# Patient Record
Sex: FEMALE | Race: OTHER | HISPANIC OR LATINO
[De-identification: names, ages, dates, MRNs, and addresses within clinical notes are randomized per-mention and may not be internally consistent; named-entity substitution may affect disease eponyms.]

---

## 2018-04-26 ENCOUNTER — APPOINTMENT (OUTPATIENT)
Dept: OBGYN | Facility: CLINIC | Age: 38
End: 2018-04-26

## 2019-03-04 ENCOUNTER — APPOINTMENT (OUTPATIENT)
Dept: INTERNAL MEDICINE | Facility: CLINIC | Age: 39
End: 2019-03-04
Payer: COMMERCIAL

## 2019-03-04 VITALS
DIASTOLIC BLOOD PRESSURE: 70 MMHG | WEIGHT: 203 LBS | BODY MASS INDEX: 37.36 KG/M2 | HEART RATE: 77 BPM | TEMPERATURE: 98.3 F | HEIGHT: 62 IN | OXYGEN SATURATION: 98 % | SYSTOLIC BLOOD PRESSURE: 121 MMHG

## 2019-03-04 DIAGNOSIS — Z00.00 ENCOUNTER FOR GENERAL ADULT MEDICAL EXAMINATION W/OUT ABNORMAL FINDINGS: ICD-10-CM

## 2019-03-04 PROCEDURE — 99396 PREV VISIT EST AGE 40-64: CPT

## 2019-03-05 LAB
25(OH)D3 SERPL-MCNC: 20.8 NG/ML
CHOLEST SERPL-MCNC: 171 MG/DL
CHOLEST/HDLC SERPL: 3.2 RATIO
HBA1C MFR BLD HPLC: 5.5 %
HDLC SERPL-MCNC: 53 MG/DL
LDLC SERPL CALC-MCNC: 99 MG/DL
TRIGL SERPL-MCNC: 97 MG/DL

## 2019-03-05 NOTE — ASSESSMENT
[FreeTextEntry1] : 38 F w/ obesity, pre-DM, vitamin D def, rt ovarian cysts here for CPE c/o varicose vein. \par \par - lipid profile, hga1c \par - vascular surg referral for varicose veins \par \par HCM \par Vaccines - tdap (last pregnancy 2013), influenza (doesn’t take) \par Cancer Screening - pap (3/2016 - nml, HPV neg). No FHx colon or breast cancer -> appt. March 14th \par ETOH / Smoking - Social / Never \par Depression Screen - Negative

## 2019-03-05 NOTE — HISTORY OF PRESENT ILLNESS
[de-identified] : 38 F w/ obesity, pre-DM, vitamin D def, rt ovarian cysts here for CPE c/o varicose vein. \par \par Patient has continued to exercise and eat well, has lost a few pounds. \par \par HCM \par Vaccines - tdap (last pregnancy 2013), influenza (doesn’t take) \par Cancer Screening - pap (3/2016 - nml, HPV neg). No FHx colon or breast cancer -> appt. March 14th \par ETOH / Smoking - Social / Never \par Depression Screen - Negative

## 2019-03-05 NOTE — PHYSICAL EXAM
[No Acute Distress] : no acute distress [Well Nourished] : well nourished [Well Developed] : well developed [Well-Appearing] : well-appearing [Normal Sclera/Conjunctiva] : normal sclera/conjunctiva [PERRL] : pupils equal round and reactive to light [Normal Outer Ear/Nose] : the outer ears and nose were normal in appearance [Normal Oropharynx] : the oropharynx was normal [Normal TMs] : both tympanic membranes were normal [No JVD] : no jugular venous distention [No Respiratory Distress] : no respiratory distress  [Clear to Auscultation] : lungs were clear to auscultation bilaterally [No Accessory Muscle Use] : no accessory muscle use [Normal Rate] : normal rate  [Regular Rhythm] : with a regular rhythm [Normal S1, S2] : normal S1 and S2 [No Murmur] : no murmur heard [No Edema] : there was no peripheral edema [Soft] : abdomen soft [Non Tender] : non-tender [Non-distended] : non-distended [No Masses] : no abdominal mass palpated [No HSM] : no HSM [Normal Bowel Sounds] : normal bowel sounds [No Rash] : no rash [Normal Insight/Judgement] : insight and judgment were intact

## 2019-03-14 ENCOUNTER — APPOINTMENT (OUTPATIENT)
Dept: OBGYN | Facility: CLINIC | Age: 39
End: 2019-03-14

## 2019-04-22 ENCOUNTER — APPOINTMENT (OUTPATIENT)
Dept: INTERNAL MEDICINE | Facility: CLINIC | Age: 39
End: 2019-04-22

## 2023-04-18 ENCOUNTER — APPOINTMENT (OUTPATIENT)
Dept: INTERNAL MEDICINE | Facility: CLINIC | Age: 43
End: 2023-04-18

## 2023-04-27 ENCOUNTER — APPOINTMENT (OUTPATIENT)
Dept: OBGYN | Facility: CLINIC | Age: 43
End: 2023-04-27
Payer: COMMERCIAL

## 2023-04-27 ENCOUNTER — LABORATORY RESULT (OUTPATIENT)
Age: 43
End: 2023-04-27

## 2023-04-27 VITALS
WEIGHT: 190 LBS | DIASTOLIC BLOOD PRESSURE: 70 MMHG | BODY MASS INDEX: 34.96 KG/M2 | SYSTOLIC BLOOD PRESSURE: 105 MMHG | HEIGHT: 62 IN

## 2023-04-27 DIAGNOSIS — Z01.419 ENCOUNTER FOR GYNECOLOGICAL EXAMINATION (GENERAL) (ROUTINE) W/OUT ABNORMAL FINDINGS: ICD-10-CM

## 2023-04-27 DIAGNOSIS — R92.2 INCONCLUSIVE MAMMOGRAM: ICD-10-CM

## 2023-04-27 PROCEDURE — 99386 PREV VISIT NEW AGE 40-64: CPT

## 2023-04-27 NOTE — HISTORY OF PRESENT ILLNESS
[FreeTextEntry1] : 35-year-old P2 LMP, regular menses\par Patient has not had a mammogram\par Last Pap 2016 was negative HPV negative\par Patient will be 43 years old in June\par Condoms for contraception\par Family history negative for cancer\par Patient has had 2 COVID vaccines

## 2023-04-27 NOTE — DISCUSSION/SUMMARY
[FreeTextEntry1] : 35-year-old P2 LMP, regular menses\par Pap with cotesting\par Screening mammogram and bilateral breast ultrasound for dense breast\par Screening colonoscopy referral\par Condoms for contraception\par Patient has had 2 COVID vaccines, by Valent COVID-vaccine recommend\par Follow-up 1 year

## 2023-05-10 ENCOUNTER — NON-APPOINTMENT (OUTPATIENT)
Age: 43
End: 2023-05-10

## 2023-05-10 LAB
CYTOLOGY CVX/VAG DOC THIN PREP: ABNORMAL
HPV HIGH+LOW RISK DNA PNL CVX: DETECTED

## 2023-05-23 ENCOUNTER — APPOINTMENT (OUTPATIENT)
Dept: OBGYN | Facility: CLINIC | Age: 43
End: 2023-05-23
Payer: COMMERCIAL

## 2023-05-23 VITALS
SYSTOLIC BLOOD PRESSURE: 115 MMHG | HEIGHT: 62 IN | WEIGHT: 197 LBS | BODY MASS INDEX: 36.25 KG/M2 | DIASTOLIC BLOOD PRESSURE: 80 MMHG

## 2023-05-23 DIAGNOSIS — R87.610 ATYPICAL SQUAMOUS CELLS OF UNDETERMINED SIGNIFICANCE ON CYTOLOGIC SMEAR OF CERVIX (ASC-US): ICD-10-CM

## 2023-05-23 DIAGNOSIS — R87.810 ATYPICAL SQUAMOUS CELLS OF UNDETERMINED SIGNIFICANCE ON CYTOLOGIC SMEAR OF CERVIX (ASC-US): ICD-10-CM

## 2023-05-23 LAB
HCG UR QL: NEGATIVE
QUALITY CONTROL: YES

## 2023-05-23 PROCEDURE — 81025 URINE PREGNANCY TEST: CPT

## 2023-05-23 PROCEDURE — 57454 BX/CURETT OF CERVIX W/SCOPE: CPT

## 2023-05-23 NOTE — PROCEDURE
[Colposcopy] : Colposcopy  [Risks] : risks [Benefits] : benefits [Alternatives] : alternatives [Patient] : patient [Infection] : infection [Bleeding] : bleeding [Allergic Reaction] : allergic reaction [ASCUS] : ASCUS [HPV High Risk] : HPV high risk [No Premedication] : no premedication [Colposcopy Adequate] : colposcopy adequate [SCI Fully Visualized] : SCI fully visualized [ECC Performed] : ECC performed [No Abnormalities] : no abnormalities [Lesion] : lesion seen [Biopsy] : biopsy taken [Hemostasis Obtained] : Hemostasis obtained [Tolerated Well] : the patient tolerated the procedure well [Pap Performed] : pap not performed [de-identified] : +HPV, negative HPV genotype [de-identified] : 3 [de-identified] : ACW lesion at 6 o'clock and 10 o'clock  [de-identified] : ACW lesion at 6 o'clock and 10 o'clock and ECC  [de-identified] : Cervical biopsy and ECC (endocervical canal narrow) [de-identified] : with  pressure and Astrin  [de-identified] : Colpo impression, Squamous metaplasia\par

## 2023-06-30 ENCOUNTER — NON-APPOINTMENT (OUTPATIENT)
Age: 43
End: 2023-06-30

## 2023-08-23 ENCOUNTER — NON-APPOINTMENT (OUTPATIENT)
Age: 43
End: 2023-08-23

## 2023-09-13 ENCOUNTER — NON-APPOINTMENT (OUTPATIENT)
Age: 43
End: 2023-09-13

## 2023-09-13 LAB — CORE LAB BIOPSY: NORMAL

## 2024-02-08 ENCOUNTER — APPOINTMENT (OUTPATIENT)
Dept: OBGYN | Facility: CLINIC | Age: 44
End: 2024-02-08
Payer: COMMERCIAL

## 2024-02-08 VITALS — DIASTOLIC BLOOD PRESSURE: 80 MMHG | SYSTOLIC BLOOD PRESSURE: 130 MMHG

## 2024-02-08 PROCEDURE — 99214 OFFICE O/P EST MOD 30 MIN: CPT

## 2024-02-08 NOTE — HISTORY OF PRESENT ILLNESS
[FreeTextEntry1] : 43-year-old LMP 1/5/2024 and bleeding since spotting 4 months patient feels like her.  Is starting to come today bleeding is getting heavier Patient is not taking hormone therapy, no history of thyroid issues No pain mild cramping Irregular cycles for the past 3 months in December menses delayed 10 days

## 2024-02-08 NOTE — PHYSICAL EXAM
[Labia Majora] : normal [Labia Minora] : normal [Moderate] : There was moderate vaginal bleeding [Normal] : normal [Retroversion] : retroverted [Uterine Adnexae] : normal [FreeTextEntry5] : cervix closed, globular posteriorly  [FreeTextEntry6] : [facility]

## 2024-02-08 NOTE — DISCUSSION/SUMMARY
[FreeTextEntry1] : 43-year-old bleeding for 1 month UCG negative today Labs Pelvic ultrasound 1 week  EMB and Endo see 2-3 weeks

## 2024-02-12 ENCOUNTER — NON-APPOINTMENT (OUTPATIENT)
Age: 44
End: 2024-02-12

## 2024-02-12 DIAGNOSIS — R79.89 OTHER SPECIFIED ABNORMAL FINDINGS OF BLOOD CHEMISTRY: ICD-10-CM

## 2024-02-12 LAB
BASOPHILS # BLD AUTO: 0.02 K/UL
BASOPHILS NFR BLD AUTO: 0.3 %
EOSINOPHIL # BLD AUTO: 0.27 K/UL
EOSINOPHIL NFR BLD AUTO: 3.6 %
FSH SERPL-MCNC: 8.2 IU/L
HCT VFR BLD CALC: 37.9 %
HGB BLD-MCNC: 12.2 G/DL
IMM GRANULOCYTES NFR BLD AUTO: 0.3 %
LYMPHOCYTES # BLD AUTO: 2.16 K/UL
LYMPHOCYTES NFR BLD AUTO: 28.9 %
MAN DIFF?: NORMAL
MCHC RBC-ENTMCNC: 28.5 PG
MCHC RBC-ENTMCNC: 32.2 GM/DL
MCV RBC AUTO: 88.6 FL
MONOCYTES # BLD AUTO: 0.51 K/UL
MONOCYTES NFR BLD AUTO: 6.8 %
NEUTROPHILS # BLD AUTO: 4.5 K/UL
NEUTROPHILS NFR BLD AUTO: 60.1 %
PLATELET # BLD AUTO: 357 K/UL
PROLACTIN SERPL-MCNC: 27.2 NG/ML
RBC # BLD: 4.28 M/UL
RBC # FLD: 13.5 %
TSH SERPL-ACNC: 0.76 UIU/ML
WBC # FLD AUTO: 7.48 K/UL

## 2024-02-15 ENCOUNTER — APPOINTMENT (OUTPATIENT)
Dept: OBGYN | Facility: CLINIC | Age: 44
End: 2024-02-15
Payer: COMMERCIAL

## 2024-02-15 ENCOUNTER — ASOB RESULT (OUTPATIENT)
Age: 44
End: 2024-02-15

## 2024-02-15 VITALS
HEIGHT: 62 IN | WEIGHT: 209 LBS | BODY MASS INDEX: 38.46 KG/M2 | SYSTOLIC BLOOD PRESSURE: 112 MMHG | DIASTOLIC BLOOD PRESSURE: 77 MMHG

## 2024-02-15 PROCEDURE — 76830 TRANSVAGINAL US NON-OB: CPT

## 2024-02-15 PROCEDURE — 76856 US EXAM PELVIC COMPLETE: CPT | Mod: 59

## 2024-03-04 ENCOUNTER — APPOINTMENT (OUTPATIENT)
Dept: OBGYN | Facility: CLINIC | Age: 44
End: 2024-03-04
Payer: COMMERCIAL

## 2024-03-04 VITALS — DIASTOLIC BLOOD PRESSURE: 81 MMHG | SYSTOLIC BLOOD PRESSURE: 119 MMHG

## 2024-03-04 LAB — PROLACTIN SERPL-MCNC: 16.8 NG/ML

## 2024-03-04 PROCEDURE — 58558Z: CUSTOM

## 2024-03-04 NOTE — PROCEDURE
[Endometrial Biopsy] : Endometrial biopsy [Irregular Bleeding] : irregular uterine bleeding [Uterine Perforation] : uterine perforation [Pain] : pain [Negative] : negative pregnancy test [No Premedication] : No premedication [Betadine] : Betadine [Tenaculum] : Tenaculum [___ mL Injected] : [unfilled] ~UmL of lidocaine [Required Dilation] : required dilation [Sounded to ___ cm] : sounded to [unfilled] ~Ucm [Anteverted] : anteverted [Moderate] : moderate [Specimen Collected] : collected [Hysteroscopy] : Hysteroscopy [No Complications] : No complications [Time out performed] : Pre-procedure time out performed.  Patient's name, date of birth and procedure confirmed. [Consent Obtained] : Consent obtained [Abnormal uterine bleeding] : abnormal uterine bleeding [Benefits] : benefits [Risks] : risks [Alternatives] : alternatives [Patient] : patient [Bleeding] : bleeding [Infection] : infection [Allergic Reaction] : allergic reaction [Lidocaine___ mL] : [unfilled] ~UmL of lidocaine [Sent to Pathology] : specimen was placed in buffered formalin and sent for pathology [Hemostasis obtained] : hemostasis obtained [Tolerated Well] : Patient tolerated the procedure well [Antibiotics given] : antibiotics not given [LMPDate] : 1/4/24 [de-identified] : 10cc lidocaine administered locally at 12 o'clock of the cervix  [de-identified] : stenotic os dilated with os finder, + blood flow of old blood with clots noted once cervix dilated  [de-identified] : Endosee office hysteroscopy (see separate note)  [de-identified] : bleeding for over 1 month  [de-identified] : Endosee office hysteroscopy: Vagina was prepped with Betadine 10 cc of local lidocaine administered at 12:00. anterior lip of cervix grasped with a tenaculum.  The cervix cervical os stenotic and dilated with an os finder EMB was performed sounded to 10 Endo see no cervical canal polyps a lower uterine segment 1 cm noted string not visualized bilaterally Limited study with polyp visualized.  100cc of saline used. Patient tolerated procedure well

## 2024-03-09 ENCOUNTER — NON-APPOINTMENT (OUTPATIENT)
Age: 44
End: 2024-03-09

## 2024-03-09 LAB — CORE LAB BIOPSY: NORMAL

## 2024-03-19 ENCOUNTER — APPOINTMENT (OUTPATIENT)
Dept: OBGYN | Facility: CLINIC | Age: 44
End: 2024-03-19

## 2024-03-21 ENCOUNTER — APPOINTMENT (OUTPATIENT)
Dept: OBGYN | Facility: CLINIC | Age: 44
End: 2024-03-21
Payer: COMMERCIAL

## 2024-03-21 VITALS — DIASTOLIC BLOOD PRESSURE: 74 MMHG | SYSTOLIC BLOOD PRESSURE: 123 MMHG

## 2024-03-21 PROCEDURE — 99202 OFFICE O/P NEW SF 15 MIN: CPT

## 2024-03-21 PROCEDURE — 99212 OFFICE O/P EST SF 10 MIN: CPT

## 2024-03-21 NOTE — HISTORY OF PRESENT ILLNESS
[FreeTextEntry1] : 42 y/o P2 LMP 3/18/24 Reviewed EMB results, + polyp, endosee + polyp   Bleeding improved with the provera.stopped bleeding on 3/6/24 after starting provera on 3/1/24  started bleeding 4 days ago and stopped the pills at that time, currently bleeding but its normal  flow not excessive.  Jeff has appointment with Dr. Reyes to plan D/C operative polyp resection Considering Mirena IUD insertion

## 2024-03-21 NOTE — DISCUSSION/SUMMARY
[FreeTextEntry1] : 44 y/o P2 LMP 3/18/24 Reviewed EMB results, + polyp, endosee + polyp  Andertaileen has appointment with Dr. Reyes to plan D/C operative polyp resection Considering Mirena IUD insertion, discussed R/B of Mirena IUD ectopic pregnancy, PID, benefits of contraception and manage the bleeding  no exam today, discussion only  f/u in 1-2 weeks to f/u ovarian cyst

## 2024-04-11 ENCOUNTER — APPOINTMENT (OUTPATIENT)
Dept: OBGYN | Facility: CLINIC | Age: 44
End: 2024-04-11

## 2024-04-16 ENCOUNTER — APPOINTMENT (OUTPATIENT)
Dept: OBGYN | Facility: CLINIC | Age: 44
End: 2024-04-16
Payer: COMMERCIAL

## 2024-04-16 VITALS
DIASTOLIC BLOOD PRESSURE: 86 MMHG | HEIGHT: 62 IN | WEIGHT: 209 LBS | SYSTOLIC BLOOD PRESSURE: 120 MMHG | BODY MASS INDEX: 38.46 KG/M2

## 2024-04-16 DIAGNOSIS — N84.0 POLYP OF CORPUS UTERI: ICD-10-CM

## 2024-04-16 PROCEDURE — 99213 OFFICE O/P EST LOW 20 MIN: CPT

## 2024-04-16 NOTE — REASON FOR VISIT
[TextEntry] : PT PRESENTS TO DISCUSS MGT OF ENDOMETRIAL POLYP.  HAS BEEN HAVING IRREG BLEEDING SINCE JAN.

## 2024-04-16 NOTE — PLAN
[FreeTextEntry1] : RECOMMEND D&C, OP HYST, POLYPECTOMY.  DISCUSSED RISKS OF PROCEDURE INCLUDING BUT NOT LIMITED TO RISKS OF GENERAL ANESTHESIA, BLEEDING, INFECTION AND UTERINE PERFORATION,  ALL QUESTIONS ANSWERED.  DISCUSSED OPTION OF INSERTING MIRENA AT TIME OF PROCEDURE.  PT DESIRES MIRENA.  WILL SCHEDULE

## 2024-05-07 ENCOUNTER — OUTPATIENT (OUTPATIENT)
Dept: OUTPATIENT SERVICES | Facility: HOSPITAL | Age: 44
LOS: 1 days | End: 2024-05-07
Payer: COMMERCIAL

## 2024-05-07 VITALS
HEART RATE: 87 BPM | DIASTOLIC BLOOD PRESSURE: 84 MMHG | HEIGHT: 61 IN | SYSTOLIC BLOOD PRESSURE: 126 MMHG | RESPIRATION RATE: 18 BRPM | OXYGEN SATURATION: 100 % | WEIGHT: 214.07 LBS | TEMPERATURE: 99 F

## 2024-05-07 DIAGNOSIS — N92.1 EXCESSIVE AND FREQUENT MENSTRUATION WITH IRREGULAR CYCLE: ICD-10-CM

## 2024-05-07 DIAGNOSIS — Z01.818 ENCOUNTER FOR OTHER PREPROCEDURAL EXAMINATION: ICD-10-CM

## 2024-05-07 DIAGNOSIS — N84.0 POLYP OF CORPUS UTERI: ICD-10-CM

## 2024-05-07 DIAGNOSIS — Z30.430 ENCOUNTER FOR INSERTION OF INTRAUTERINE CONTRACEPTIVE DEVICE: ICD-10-CM

## 2024-05-07 LAB
HCT VFR BLD CALC: 37.7 % — SIGNIFICANT CHANGE UP (ref 34.5–45)
HGB BLD-MCNC: 11.9 G/DL — SIGNIFICANT CHANGE UP (ref 11.5–15.5)
MCHC RBC-ENTMCNC: 27.5 PG — SIGNIFICANT CHANGE UP (ref 27–34)
MCHC RBC-ENTMCNC: 31.6 GM/DL — LOW (ref 32–36)
MCV RBC AUTO: 87.3 FL — SIGNIFICANT CHANGE UP (ref 80–100)
NRBC # BLD: 0 /100 WBCS — SIGNIFICANT CHANGE UP (ref 0–0)
PLATELET # BLD AUTO: 371 K/UL — SIGNIFICANT CHANGE UP (ref 150–400)
RBC # BLD: 4.32 M/UL — SIGNIFICANT CHANGE UP (ref 3.8–5.2)
RBC # FLD: 13.1 % — SIGNIFICANT CHANGE UP (ref 10.3–14.5)
WBC # BLD: 8.64 K/UL — SIGNIFICANT CHANGE UP (ref 3.8–10.5)
WBC # FLD AUTO: 8.64 K/UL — SIGNIFICANT CHANGE UP (ref 3.8–10.5)

## 2024-05-07 PROCEDURE — G0463: CPT

## 2024-05-07 PROCEDURE — 85027 COMPLETE CBC AUTOMATED: CPT

## 2024-05-07 PROCEDURE — 86803 HEPATITIS C AB TEST: CPT

## 2024-05-07 RX ORDER — SODIUM CHLORIDE 9 MG/ML
1000 INJECTION, SOLUTION INTRAVENOUS
Refills: 0 | Status: DISCONTINUED | OUTPATIENT
Start: 2024-05-28 | End: 2024-06-12

## 2024-05-07 RX ORDER — MEDROXYPROGESTERONE ACETATE 10 MG/1
10 TABLET ORAL
Qty: 30 | Refills: 0 | Status: ACTIVE | COMMUNITY
Start: 2024-03-04 | End: 1900-01-01

## 2024-05-07 NOTE — H&P PST ADULT - PROBLEM SELECTOR PLAN 1
Pt. scheduled for D&C , Operative Hysteroscopy, Polypectomy with Aveta, IUD Insertion (Mirena) with Dr. Reyes on 5/28/24.  Pre-op instructions given, all questions answered.  Specimen Cup given.  Labs: CBC, Hep C

## 2024-05-07 NOTE — H&P PST ADULT - HISTORY OF PRESENT ILLNESS
44 y/o  (LMP 2024) with PMH uterine polyp, PShx  x2. C/o abnormal uterine bleeding. Pt states in January her menstrual started but never stopped. Pt was seen by GYN and started on Provera 3/1/24. Reports she stopped bleeding on 3/6/24 after starting provera on 3/1/24. Endorse she had a short cycle in April and stopped after a few days. EMB results, reveal + polyp. Denies any Hematuria, dysuria, chest pain, palpitations, SOB, N/V, fever or chills. She now presents to PST prior to D&C , Operative Hysteroscopy, Polypectomy with Aveta, IUD Insertion (Mirena) with Dr. Reyes on 24.  ?

## 2024-05-07 NOTE — H&P PST ADULT - NSANTHTOTALSCORECAL_ENT_A_CORE
Referral Reason:sob



MEASUREMENTS

--------

HEIGHT: 170.2 cm

WEIGHT: 65.8 kg

BP: 177/101

RVIDd:   2.8 cm     (< 3.3)

IVSd:   1.0 cm     (0.6 - 1.1)

LVIDd:   4.4 cm     (3.9 - 5.3)

LVPWd:   1.0 cm     (0.6 - 1.1)

IVSs:   1.5 cm

LVIDs:   2.9 cm

LVPWs:   1.4 cm

LAESV Index (A-L):   22.44 ml/m

Ao Diam:   3.8 cm     (2.0 - 3.7)

AV Cusp:   2.3 cm     (1.5 - 2.6)

LA Diam:   3.2 cm     (2.7 - 3.8)

EPSS:   1.0 cm

MV E Danie:   0.96 m/s

MV DecT:   210 ms

MV A Danie:   0.85 m/s

MV E/A Ratio:   1.13 

RAP:   5.00 mmHg

RVSP:   22.02 mmHg

MV EF SLOPE:   179.51 mm/s     (70 - 150)

MV EXCURSION:   2.51 cm     (> 18.000)







FINDINGS

--------

Sinus rhythm.

This was a technically adequate study.

The left ventricular size is normal.   Left ventricular wall thickness is normal.   Overall left vent
ricular systolic function is normal with, an EF between 55 - 60 %.

The right ventricle is normal in size and function.

Normal LA  size by volume 22+/-6 ml/m2.

The right atrium is normal in size.

Aortic valve is trileaflet and is mildly thickened.   There is no evidence of aortic regurgitation.  
 There is no evidence of aortic stenosis.

Mild mitral annular calcification present.   There is trace to mild mitral regurgitation.

Trace tricuspid regurgitation present.   Right ventricular systolic pressure is normal at < 35 mmHg. 
  There is no evidence of pulmonary hypertension.

Trace/mild (physiologic)  pulmonic regurgitation.

The aortic root size is normal.

Normal inferior vena cava with normal inspiratory collapse consistent with estimated right atrial pre
ssure of  5 mmHg.

There is no pericardial effusion.



CONCLUSIONS

--------

1. Sinus rhythm.

2. This was a technically adequate study.

3. The left ventricular size is normal.

4. Left ventricular wall thickness is normal.

5. Overall left ventricular systolic function is normal with, an EF between 55 - 60 %.

6. Normal LA size by volume 22+/-6 ml/m2.

7. Aortic valve is trileaflet and is mildly thickened.

8. Mild mitral annular calcification present.

9. There is trace to mild mitral regurgitation.

10. Trace tricuspid regurgitation present.

11. Right ventricular systolic pressure is normal at < 35 mmHg.

12. There is no evidence of pulmonary hypertension.

13. Trace/mild (physiologic)  pulmonic regurgitation.

14. The aortic root size is normal.

15. There is no pericardial effusion.





SONOGRAPHER: Mal Lara RDCS
0

## 2024-05-07 NOTE — H&P PST ADULT - ATTENDING COMMENTS
44yo w irreg bleeding.  Admit for D&C, op hyst, polypectomy and insertion of Mirena IUD.  Discussed risks and benefits of procedure.  All questions answered.  Consent signed.

## 2024-05-07 NOTE — H&P PST ADULT - ASSESSMENT
DASI Score:8.97  DASI Activity: Pt drives, walks 30 minutes day, performs ADL's without assistance able to go up one flight of stairs or walk 1-2 blocks with out difficulty  Loose or removable teeth: denies

## 2024-05-08 LAB
HCV AB S/CO SERPL IA: 0.11 S/CO — SIGNIFICANT CHANGE UP (ref 0–0.99)
HCV AB SERPL-IMP: SIGNIFICANT CHANGE UP

## 2024-05-27 ENCOUNTER — TRANSCRIPTION ENCOUNTER (OUTPATIENT)
Age: 44
End: 2024-05-27

## 2024-05-28 ENCOUNTER — NON-APPOINTMENT (OUTPATIENT)
Age: 44
End: 2024-05-28

## 2024-05-28 ENCOUNTER — TRANSCRIPTION ENCOUNTER (OUTPATIENT)
Age: 44
End: 2024-05-28

## 2024-05-28 ENCOUNTER — APPOINTMENT (OUTPATIENT)
Dept: OBGYN | Facility: CLINIC | Age: 44
End: 2024-05-28

## 2024-05-28 ENCOUNTER — OUTPATIENT (OUTPATIENT)
Dept: OUTPATIENT SERVICES | Facility: HOSPITAL | Age: 44
LOS: 1 days | End: 2024-05-28
Payer: COMMERCIAL

## 2024-05-28 ENCOUNTER — RESULT REVIEW (OUTPATIENT)
Age: 44
End: 2024-05-28

## 2024-05-28 VITALS
OXYGEN SATURATION: 99 % | TEMPERATURE: 98 F | RESPIRATION RATE: 16 BRPM | HEIGHT: 61 IN | HEART RATE: 83 BPM | SYSTOLIC BLOOD PRESSURE: 113 MMHG | DIASTOLIC BLOOD PRESSURE: 74 MMHG | WEIGHT: 214.07 LBS

## 2024-05-28 VITALS
HEART RATE: 85 BPM | RESPIRATION RATE: 18 BRPM | SYSTOLIC BLOOD PRESSURE: 127 MMHG | DIASTOLIC BLOOD PRESSURE: 70 MMHG | OXYGEN SATURATION: 98 %

## 2024-05-28 DIAGNOSIS — N84.0 POLYP OF CORPUS UTERI: ICD-10-CM

## 2024-05-28 DIAGNOSIS — Z30.430 ENCOUNTER FOR INSERTION OF INTRAUTERINE CONTRACEPTIVE DEVICE: ICD-10-CM

## 2024-05-28 DIAGNOSIS — N92.1 EXCESSIVE AND FREQUENT MENSTRUATION WITH IRREGULAR CYCLE: ICD-10-CM

## 2024-05-28 PROCEDURE — 58300 INSERT INTRAUTERINE DEVICE: CPT

## 2024-05-28 PROCEDURE — 88305 TISSUE EXAM BY PATHOLOGIST: CPT

## 2024-05-28 PROCEDURE — 88305 TISSUE EXAM BY PATHOLOGIST: CPT | Mod: 26

## 2024-05-28 PROCEDURE — 58558 HYSTEROSCOPY BIOPSY: CPT

## 2024-05-28 PROCEDURE — C1782: CPT

## 2024-05-28 DEVICE — IUD MIRENA: Type: IMPLANTABLE DEVICE | Status: FUNCTIONAL

## 2024-05-28 DEVICE — AVETA SMOL RESECTING DEVICE 2.9MM: Type: IMPLANTABLE DEVICE | Status: FUNCTIONAL

## 2024-05-28 RX ORDER — OXYCODONE HYDROCHLORIDE 5 MG/1
10 TABLET ORAL ONCE
Refills: 0 | Status: DISCONTINUED | OUTPATIENT
Start: 2024-05-28 | End: 2024-05-28

## 2024-05-28 RX ORDER — LIDOCAINE HCL 20 MG/ML
0.2 VIAL (ML) INJECTION ONCE
Refills: 0 | Status: COMPLETED | OUTPATIENT
Start: 2024-05-28 | End: 2024-05-28

## 2024-05-28 RX ORDER — FENTANYL CITRATE 50 UG/ML
25 INJECTION INTRAVENOUS
Refills: 0 | Status: DISCONTINUED | OUTPATIENT
Start: 2024-05-28 | End: 2024-05-28

## 2024-05-28 RX ORDER — ONDANSETRON 8 MG/1
4 TABLET, FILM COATED ORAL ONCE
Refills: 0 | Status: DISCONTINUED | OUTPATIENT
Start: 2024-05-28 | End: 2024-06-12

## 2024-05-28 RX ORDER — MEDROXYPROGESTERONE ACETATE 150 MG/ML
1 INJECTION, SUSPENSION, EXTENDED RELEASE INTRAMUSCULAR
Refills: 0 | DISCHARGE

## 2024-05-28 RX ADMIN — SODIUM CHLORIDE 100 MILLILITER(S): 9 INJECTION, SOLUTION INTRAVENOUS at 13:43

## 2024-05-28 NOTE — ASU DISCHARGE PLAN (ADULT/PEDIATRIC) - ASU DC SPECIAL INSTRUCTIONSFT
Postoperative Instructions      Pain control     For pain control, take the followin. Motrin 600mg four times a day, take with food.  2. Add Tylenol 975 four times a day, alternated with motrin.    Motrin and Tylenol can be obtained over the counter.    Postoperative restrictions   Do not drive or make important decisions for 24 hours after anesthesia. Nothing in the vagina (tampons, sexual intercourse), no tub baths, pools or hot tubs for 2 weeks (showers are ok!). No lifting anything heavier than 15 lbs, no strenuous exercise for 2 weeks after surgery.        Vaginal bleeding   Spotting and intermittent passage of blood clots per vagina is normal in first few weeks after surgery.  If you are soaking 1 pad per hour, that is NOT normal and you should notify Dr. Reyes's office and seek medical attention right away.      Signs of Infection    Call the office and/or come to the emergency room for any of the following: foul smelling vaginal discharge, fever over 100.4F, abdominal pain or cramping that does not get better with over the counter medications, nausea/vomiting (especially if you become unable to tolerate oral intake), inability to urinate. Any of these could be signs that you may be developing an infection requiring antibiotics.      Follow Up  Follow up with Dr. Reyes's office for postoperative appointment on  at 5pm.

## 2024-05-28 NOTE — BRIEF OPERATIVE NOTE - NSICDXBRIEFPOSTOP_GEN_ALL_CORE_FT
POST-OP DIAGNOSIS:  Polyp of corpus uteri 28-May-2024 15:24:43  Marline Hernandes  Excessive and frequent menstruation 28-May-2024 15:24:53  Marline Hernandes  Encounter for IUD insertion 28-May-2024 15:25:14  Marline Hernandes

## 2024-05-28 NOTE — PRE-ANESTHESIA EVALUATION ADULT - NSANTHADDINFOFT_GEN_ALL_CORE
r/b/a discussed  all questions answered
Detail Level: Simple
Additional Notes: Patient consent was obtained to proceed with the visit and recommended plan of care after discussion of all risks and benefits, including the risks of COVID-19 exposure.

## 2024-05-28 NOTE — ASU DISCHARGE PLAN (ADULT/PEDIATRIC) - NURSING INSTRUCTIONS
Next dose of tylenol at/after_0830pm___. Do not exceed 4000mg in a 24hour  period. Every 6hours as needed.   Next dose of motrin at/after 0830pm

## 2024-05-28 NOTE — BRIEF OPERATIVE NOTE - NSICDXBRIEFPREOP_GEN_ALL_CORE_FT
PRE-OP DIAGNOSIS:  Polyp of corpus uteri 28-May-2024 15:24:16  Marline Hernandes  Excessive and frequent menstruation 28-May-2024 15:24:34  Marline Hernandes  Encounter for IUD insertion 28-May-2024 15:25:22  Marline Hernandes

## 2024-05-28 NOTE — ASU DISCHARGE PLAN (ADULT/PEDIATRIC) - CARE PROVIDER_API CALL
Rossy Reyes  Obstetrics and Gynecology  [address], [facility] [facility] [address]  [place] [address]  Phone: [phone]  Fax: [phone]  Follow Up Time:

## 2024-05-28 NOTE — ASU DISCHARGE PLAN (ADULT/PEDIATRIC) - ACTIVITY LEVEL
Interval History: Feeling better, may dc to SNF, C/W current care    Review of Systems   Constitutional:  Positive for activity change. Negative for appetite change, chills and fever.   HENT:  Negative for sore throat.    Respiratory:  Positive for cough and shortness of breath.    Cardiovascular:  Negative for chest pain, palpitations and leg swelling.   Gastrointestinal:  Negative for abdominal pain and nausea.   Genitourinary:  Negative for dysuria.   Musculoskeletal:  Negative for back pain.   Skin:  Negative for rash.   Neurological:  Positive for weakness.     Objective:     Vital Signs (Most Recent):  Temp: 99 °F (37.2 °C) (08/08/23 0925)  Pulse: 103 (08/08/23 0925)  Resp: 20 (08/08/23 0925)  BP: 128/66 (08/08/23 0925)  SpO2: (!) 91 % (08/08/23 0925) Vital Signs (24h Range):  Temp:  [98.3 °F (36.8 °C)-99 °F (37.2 °C)] 99 °F (37.2 °C)  Pulse:  [] 103  Resp:  [16-24] 20  SpO2:  [88 %-98 %] 91 %  BP: (128-198)/(66-98) 128/66     Weight: 91.5 kg (201 lb 11.5 oz)  Body mass index is 34.63 kg/m².    Intake/Output Summary (Last 24 hours) at 8/8/2023 1351  Last data filed at 8/7/2023 2358  Gross per 24 hour   Intake --   Output 500 ml   Net -500 ml         Physical Exam  Constitutional:       Appearance: She is obese. She is ill-appearing.      Interventions: Nasal cannula in place.   HENT:      Head: Normocephalic and atraumatic.   Eyes:      General: No scleral icterus.     Pupils: Pupils are equal, round, and reactive to light.   Cardiovascular:      Rate and Rhythm: Normal rate and regular rhythm.      Pulses: Normal pulses.      Heart sounds: No murmur heard.  Pulmonary:      Effort: Pulmonary effort is normal. No respiratory distress.      Breath sounds: Rales present. No wheezing.   Abdominal:      General: Bowel sounds are normal. There is no distension.      Palpations: Abdomen is soft.      Tenderness: There is no abdominal tenderness. There is no guarding.   Musculoskeletal:      Cervical back: Neck  No exercise/No heavy lifting/Nothing per vagina/No tub baths/No douching/No tampons/No intercourse supple.      Right lower leg: No edema.      Left lower leg: No edema.   Skin:     General: Skin is warm and dry.   Neurological:      General: No focal deficit present.   Psychiatric:         Mood and Affect: Mood normal.         Behavior: Behavior normal.             Significant Labs: All pertinent labs within the past 24 hours have been reviewed.  CBC:   Recent Labs   Lab 08/07/23  1454 08/08/23  0316   WBC 6.23 3.68*   HGB 8.7* 9.7*   HCT 32.2* 34.6*    255       Significant Imaging: I have reviewed all pertinent imaging results/findings within the past 24 hours.

## 2024-05-28 NOTE — BRIEF OPERATIVE NOTE - OPERATION/FINDINGS
EUA: Normal external female genitalia. Small mobile retroverted and retroflexed uterus, no adnexal masses palpated bilaterally.   Ostia visualized bilaterally. Normal appearing endometrium with scattered areas of polypoid tissue throughout cavity. Fluid deficit 160cc.  EUA: Normal external female genitalia. Small mobile retroverted and retroflexed uterus, no adnexal masses palpated bilaterally. Uterus sounded to 10cm.  Ostia visualized bilaterally. Normal appearing endometrium with scattered areas of polypoid tissue throughout cavity. Fluid deficit 160cc.

## 2024-05-28 NOTE — ASU DISCHARGE PLAN (ADULT/PEDIATRIC) - PROCEDURE
Pelvic exam under anesthesia, operative hysteroscopy with polypectomy, dilation and curettage Pelvic exam under anesthesia, operative hysteroscopy with polypectomy, dilation and curettage, insertion of IUD

## 2024-05-28 NOTE — BRIEF OPERATIVE NOTE - NSICDXBRIEFPROCEDURE_GEN_ALL_CORE_FT
PROCEDURES:  Pelvic examination under anesthesia 28-May-2024 15:25:29  Marline Hernandes  Operative hysteroscopy with fluid management system 28-May-2024 15:25:37  Marline Hernandes  Insertion of Mirena IUD 28-May-2024 15:25:47  Marline Hernandes  Dilation and curettage, uterus 28-May-2024 15:25:55  Marline Hernandes

## 2024-05-28 NOTE — ASU PATIENT PROFILE, ADULT - BLOOD AVOIDANCE/RESTRICTIONS, PROFILE
Spoke with patient. States he has no questions or concerns in regards of his upcoming appointment. States he will call clinic office back if he should have any further questions   none

## 2024-06-04 LAB — SURGICAL PATHOLOGY STUDY: SIGNIFICANT CHANGE UP

## 2024-06-11 ENCOUNTER — APPOINTMENT (OUTPATIENT)
Dept: OBGYN | Facility: CLINIC | Age: 44
End: 2024-06-11
Payer: COMMERCIAL

## 2024-06-11 VITALS
BODY MASS INDEX: 40.3 KG/M2 | WEIGHT: 219 LBS | DIASTOLIC BLOOD PRESSURE: 77 MMHG | HEIGHT: 62 IN | SYSTOLIC BLOOD PRESSURE: 114 MMHG

## 2024-06-11 DIAGNOSIS — Z30.431 ENCOUNTER FOR ROUTINE CHECKING OF INTRAUTERINE CONTRACEPTIVE DEVICE: ICD-10-CM

## 2024-06-11 DIAGNOSIS — N92.1 EXCESSIVE AND FREQUENT MENSTRUATION WITH IRREGULAR CYCLE: ICD-10-CM

## 2024-06-11 PROCEDURE — 99213 OFFICE O/P EST LOW 20 MIN: CPT

## 2024-06-11 NOTE — PHYSICAL EXAM
[Labia Majora] : normal [Labia Minora] : normal [Normal] : normal [IUD String] : an IUD string was noted [Uterine Adnexae] : normal

## 2024-06-11 NOTE — REASON FOR VISIT
[Follow-Up] : a follow-up evaluation of [TextEntry] : S/P OP HYST AND INSERTION OF MIRENA IUD.  NO COMPLAINTS.  HAD LIGHT MENSES LAST WK

## (undated) DEVICE — GLV 6.5 PROTEXIS (WHITE)

## (undated) DEVICE — SOL IRR GLYCINE 1.5% 3000L

## (undated) DEVICE — TUBING STRYKER HYSTEROSCOPY INFLOW OUTFLOW

## (undated) DEVICE — WARMING BLANKET UPPER ADULT

## (undated) DEVICE — DRAPE LIGHT HANDLE COVER (GREEN)

## (undated) DEVICE — AVETA FLUID MANAGEMENT ACCESSORY W CAP

## (undated) DEVICE — NSA-STRYKER VIDEO TOWER: Type: DURABLE MEDICAL EQUIPMENT

## (undated) DEVICE — SOL IRR BAG NS 0.9% 3000ML

## (undated) DEVICE — AVETA CORAL HYSTEROSCOPE 4.6MM DISP

## (undated) DEVICE — AVETA FLUID MANAGEMENT ACCESSORY

## (undated) DEVICE — DRAPE 1/2 SHEET 40X57"

## (undated) DEVICE — SOL IRR POUR NS 0.9% 500ML

## (undated) DEVICE — PACK LITHOTOMY